# Patient Record
Sex: FEMALE | URBAN - METROPOLITAN AREA
[De-identification: names, ages, dates, MRNs, and addresses within clinical notes are randomized per-mention and may not be internally consistent; named-entity substitution may affect disease eponyms.]

---
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
## 2023-01-10 NOTE — PROCEDURE: TREATMENT REGIMEN
Detail Level: Detailed
Continue Regimen: Increase Accutane 40mg to twice a day
Plan: Labs given: CBC without diff, hepatic functions n panel, cholesterol, triglycerides, hcg beta subunit, qualm serum preg, bmp
Otc Regimen: Apply Aquaphor as needed for dryness
Carac Counseling:  I discussed with the patient the risks of Carac including but not limited to erythema, scaling, itching, weeping, crusting, and pain.